# Patient Record
Sex: MALE
[De-identification: names, ages, dates, MRNs, and addresses within clinical notes are randomized per-mention and may not be internally consistent; named-entity substitution may affect disease eponyms.]

---

## 2017-05-15 ENCOUNTER — HOSPITAL ENCOUNTER (OUTPATIENT)
Dept: HOSPITAL 14 - H.OPSURG | Age: 62
Discharge: HOME | End: 2017-05-15
Attending: ANESTHESIOLOGY
Payer: MEDICARE

## 2017-05-15 VITALS — HEART RATE: 59 BPM | DIASTOLIC BLOOD PRESSURE: 74 MMHG | TEMPERATURE: 97.6 F | SYSTOLIC BLOOD PRESSURE: 121 MMHG

## 2017-05-15 VITALS — RESPIRATION RATE: 18 BRPM

## 2017-05-15 VITALS — OXYGEN SATURATION: 99 %

## 2017-05-15 VITALS — BODY MASS INDEX: 30.1 KG/M2

## 2017-05-15 DIAGNOSIS — M54.16: Primary | ICD-10-CM

## 2017-05-15 DIAGNOSIS — E11.9: ICD-10-CM

## 2017-05-15 PROCEDURE — 82948 REAGENT STRIP/BLOOD GLUCOSE: CPT

## 2017-05-15 PROCEDURE — 64483 NJX AA&/STRD TFRM EPI L/S 1: CPT

## 2017-05-15 NOTE — OP
PROCEDURE DATE: 05/15/2017



PROCEDURE:  Right transforaminal epidural at right L5 under fluoroscopic 
guidance.



PREOPERATIVE DIAGNOSIS:  Lumbar radiculopathy.



POSTOPERATIVE DIAGNOSIS:  Lumbar radiculopathy.



COMPLICATIONS:  None.



EXPECTED BLOOD LOSS:  None.



After informed consent was obtained, the patient was brought to the OR and 
placed on the table in prone position.  All pressure points were padded, and 
sedation was administered by anesthesia.  Next, lumbosacral spine was then 
prepped and draped with iodine x 3 and draped in normal sterile fashion.  The 
right L5 transforaminal space was identified using AP and oblique views.  One 
mL of 1% lidocaine was used to create a skin wheal.  Using a 22 gauge 3-1/2 
inch spinal needle, the needle was advanced into the right L5 transforaminal 
epidural space, towards 6 oclock position on right L5 pedicle, without 
paresthesia.  After negative aspiration for heme or CSF, 2 mL of Omnipaque 300 
contrast dye was used to delineate the epidural space.  After negative 
aspiration for heme or CSF, 5 mL of 0.5% lidocaine and Depo-Medrol was easily 
instilled.  This procedure was attempted at the right L4 transforaminal 
epidural space, but the needle could not be inserted into the right L4 
transforaminal epidural space.  The patient had no paresthesia during the 
procedure.   Depo-Medrol 80 mg were used for the case.  The patient had no 
paresthesia during the procedure.  The patient was brought to stage II recovery 
room with bilateral lower extremity motor and sensory intact.





__________________________________________

Mario Chakraborty MD







cc:   



DD: 05/15/2017 10:38:44  [id number]

TT: 05/15/2017 11:35:23

Job # [id number]

en

MTDD

## 2017-10-23 ENCOUNTER — HOSPITAL ENCOUNTER (OUTPATIENT)
Dept: HOSPITAL 14 - H.OPSURG | Age: 62
Discharge: HOME | End: 2017-10-23
Attending: ANESTHESIOLOGY
Payer: MEDICARE

## 2017-10-23 VITALS
SYSTOLIC BLOOD PRESSURE: 127 MMHG | DIASTOLIC BLOOD PRESSURE: 77 MMHG | HEART RATE: 62 BPM | RESPIRATION RATE: 18 BRPM | OXYGEN SATURATION: 98 %

## 2017-10-23 VITALS — BODY MASS INDEX: 27.4 KG/M2

## 2017-10-23 VITALS — TEMPERATURE: 98.2 F

## 2017-10-23 DIAGNOSIS — I25.10: ICD-10-CM

## 2017-10-23 DIAGNOSIS — G40.909: ICD-10-CM

## 2017-10-23 DIAGNOSIS — E11.9: ICD-10-CM

## 2017-10-23 DIAGNOSIS — M54.16: Primary | ICD-10-CM

## 2017-10-23 PROCEDURE — 62323 NJX INTERLAMINAR LMBR/SAC: CPT

## 2017-10-23 PROCEDURE — 82948 REAGENT STRIP/BLOOD GLUCOSE: CPT

## 2017-10-23 NOTE — RAD
PROCEDURE:  Epidural injection



HISTORY:

PAIN MANAGEMENT



COMPARISON:

None



TECHNIQUE:

Standard protocol for this study/examination.



FINDINGS:

 Total fluoroscopic time (continuous mode) utilized during the 

procedure: 3.8 seconds.



IMPRESSION:

Less than 1 hr fluoroscopic time utilized during performance of the 

procedure.

## 2017-10-24 NOTE — OP
PROCEDURE DATE:  10/23/2017



PREOPERATIVE DIAGNOSIS:  Lumbar radiculopathy.



POSTOPERATIVE DIAGNOSIS:  Lumbar radiculopathy.



PROCEDURE:  Caudal epidural under fluoroscopic guidance.



ESTIMATED BLOOD LOSS:  None.



COMPLICATIONS:  None.



DESCRIPTION OF PROCEDURE:  After informed consent was obtained, the patient

was brought to the OR and placed on the table in a prone position.  All

pressure points were padded and sedation was administered by Anesthesia. 

The lumbosacral spine was prepped and draped with iodine x3 and draped in

normal sterile fashion.  2 mL of 1% lidocaine was used to create a skin

wheal using a 25-gauge needle.  X-ray was used in the AP and lateral views

to identify the sacral hiatus.  A 22-gauge Tuohy needle was advanced under

fluoroscopic guidance to yarbrough the sacrococcygeal ligament.  There was no

paresthesia during placement of the needle.  After a negative aspiration

for heme or CSF, 2 mL of contrast was used to delineate the epidural space.

After negative aspiration for heme or CSF, 20 mL of mixture of 0.5% 
preservative free

lidocaine, 0.25% preservative free Marcaine, and 80 mg of Depo-Medrol was 
easily instilled

into the epidural space.  There was no paresthesia during the procedure. 

After the injection, the patient was brought to stage II recovery room. 

The patient had bilateral lower extremity motor and sensory intact.  The

patient was discharged to same day surgery with stable vital signs.



__________________________________________

Mario Chakraborty MD



DD:  10/23/2017 13:28:39

DT:  10/23/2017 14:00:11

Job # [id number]



STEPHANIE

## 2018-04-23 ENCOUNTER — HOSPITAL ENCOUNTER (OUTPATIENT)
Dept: HOSPITAL 14 - H.OPSURG | Age: 63
Discharge: HOME | End: 2018-04-23
Attending: ANESTHESIOLOGY
Payer: MEDICARE

## 2018-04-23 VITALS — OXYGEN SATURATION: 100 %

## 2018-04-23 VITALS
HEART RATE: 52 BPM | TEMPERATURE: 98 F | RESPIRATION RATE: 18 BRPM | DIASTOLIC BLOOD PRESSURE: 70 MMHG | SYSTOLIC BLOOD PRESSURE: 111 MMHG

## 2018-04-23 VITALS — BODY MASS INDEX: 29.4 KG/M2

## 2018-04-23 DIAGNOSIS — E11.9: ICD-10-CM

## 2018-04-23 DIAGNOSIS — E78.5: ICD-10-CM

## 2018-04-23 DIAGNOSIS — I10: ICD-10-CM

## 2018-04-23 DIAGNOSIS — G40.909: ICD-10-CM

## 2018-04-23 DIAGNOSIS — M46.1: Primary | ICD-10-CM

## 2018-04-23 PROCEDURE — 82948 REAGENT STRIP/BLOOD GLUCOSE: CPT

## 2018-04-23 NOTE — RAD
PROCEDURE:  Fluoroscopy up to 1 hr.



HISTORY:

INJECTION



COMPARISON:

None



TECHNIQUE:

Standard protocol for this study/examination.



FINDINGS:

 Total fluoroscopic time (continuous mode) utilized during the 

procedure 16.5 (seconds).  Total exam DLP: (mGy) 2.14 



IMPRESSION:

Less than 1 hr fluoroscopic time utilized during performance of the 

procedure.

## 2018-04-27 NOTE — OP
DATE:  04/23/2018



LOCATION:  [facility].



PREOPERATIVE DIAGNOSIS:  Sacroiliitis.



POSTOPERATIVE DIAGNOSIS:  Sacroiliitis.



PROCEDURE:  Sacroiliac joint injection under x-ray guidance.



SURGEON:  Mario Chakraborty MD



ANESTHESIOLOGIST:  Hemant Hernandez MD



COMPLICATIONS:  None.



ESTIMATED BLOOD LOSS:  None.



DESCRIPTION OF PROCEDURE:  After informed consent was obtained, the patient

was brought to the OR and placed on the table on prone position.  All

pressure points were padded and sedation was administered by Anesthesia. 

The lumbosacral spine was prepped with iodine x3 and draped in normal

sterile fashion.  X-ray was used in the AP view to identify the sacroiliac

joint.  A 3 mL of 1% lidocaine was used to create a skin wheal, using a

25-gauge needle.  A 22-gauge 3-1/2-inch spinal needle was placed to bony

contact at the inferomedial portion of the right sacroiliac joint.  After

negative aspiration for heme or CSF, 2 mL of 1% lidocaine and Depo-Medrol

was easily instilled.  This procedure was repeated for the middle and

superior borders of the right sacroiliac joint and lower border of the left

sacroiliac joint.  There was no paresthesia during the case.  An 80 mg of

Depo-Medrol was used for the case.  The patient was brought to stage II

recovery room with bilateral lower extremity motor and sensory intact.







__________________________________________

Mario Chakraborty MD





DD:  04/26/2018 7:11:08

DT:  04/26/2018 8:33:47

Job # [id number]



MTDD

## 2018-09-04 ENCOUNTER — HOSPITAL ENCOUNTER (EMERGENCY)
Dept: HOSPITAL 14 - H.ER | Age: 63
Discharge: HOME | End: 2018-09-04
Payer: MEDICARE

## 2018-09-04 VITALS
SYSTOLIC BLOOD PRESSURE: 114 MMHG | OXYGEN SATURATION: 100 % | HEART RATE: 57 BPM | DIASTOLIC BLOOD PRESSURE: 63 MMHG | RESPIRATION RATE: 18 BRPM

## 2018-09-04 VITALS — TEMPERATURE: 97.7 F

## 2018-09-04 VITALS — BODY MASS INDEX: 29.4 KG/M2

## 2018-09-04 DIAGNOSIS — I10: ICD-10-CM

## 2018-09-04 DIAGNOSIS — Z79.84: ICD-10-CM

## 2018-09-04 DIAGNOSIS — Z82.49: ICD-10-CM

## 2018-09-04 DIAGNOSIS — Z79.82: ICD-10-CM

## 2018-09-04 DIAGNOSIS — E03.9: ICD-10-CM

## 2018-09-04 DIAGNOSIS — E78.00: ICD-10-CM

## 2018-09-04 DIAGNOSIS — I25.10: ICD-10-CM

## 2018-09-04 DIAGNOSIS — Z95.1: ICD-10-CM

## 2018-09-04 DIAGNOSIS — E11.9: ICD-10-CM

## 2018-09-04 DIAGNOSIS — M79.1: Primary | ICD-10-CM

## 2018-09-04 NOTE — ED PDOC
Lower Extremity Pain/Injury


Time Seen by Provider: 09/04/18 18:38


Chief Complaint (Nursing): Chest Pain


History Per: Patient


Onset/Duration Of Symptoms: Days (2)


Current Symptoms Are (Timing): Still Present


Severity: Mild


Additional Complaint(s): 





Left thigh pain x 2 days. No h/o trauma. Also c/o right sided chest pain. 

Denies SOB





Past Medical History


Vital Signs: 





 Last Vital Signs











Temp  97.7 F   09/04/18 17:50


 


Pulse  68   09/04/18 17:50


 


Resp  16   09/04/18 17:50


 


BP  97/61 L  09/04/18 17:50


 


Pulse Ox  98   09/04/18 17:50














- Medical History


PMH: CAD, Diabetes, HTN, Hypercholesterolemia, Hypothyroidism, Seizures


   Denies: HIV, Chronic Kidney Disease





- Surgical History


Surgical History: CABG (X 5 in 2006)





- Family History


Family History: States: Unknown Family Hx, CAD





- Immunization History


Hx Tetanus Toxoid Vaccination: No


Hx Influenza Vaccination: No


Hx Pneumococcal Vaccination: No





- Home Medications


Home Medications: 


 Ambulatory Orders











 Medication  Instructions  Recorded


 


Lisinopril 20 mg PO DAILY 03/24/16


 


Vitamin B Complex 1 cap PO HS 03/24/16


 


Ascorbic Acid [Vitamin C] 1,000 mg PO HS #0 tablet 10/26/16


 


Aspirin [Aspirin EC] 325 mg PO HS #0 tablet. 10/26/16


 


Atorvastatin [Lipitor] 40 mg PO DAILY #0 tab 10/26/16


 


Glimepiride 1 mg PO BID #0 tablet 10/26/16


 


Magnesium Oxide 500 mg PO HS #0 tablet 10/26/16


 


MetFORMIN [glucoPHAGE] 1,000 mg PO BID #0 tab 10/26/16


 


Metoprolol Tartrate [Lopressor] 25 mg PO Q12 #0 tab 10/26/16


 


Calcium Carbonate/Vitamin D3 1 tab PO DAILY 12/27/16





[Calcium 1,000 + D3 Caplet]  


 


Cyanocobalamin [Vitamin B12 1000 1 tab PO DAILY 12/27/16





mcg Tab]  


 


Levetiracetam [Keppra] 1,500 mg PO BID 30 Days  tablet 12/27/16


 


Omega-3-Acid Ethyl Esters [OMEGA 3] 1,000 mg PO DAILY 12/27/16


 


Pyridoxine [Vitamin B6] 200 mg PO DAILY 12/27/16


 


Vitamin E [Vitamin E 400 Units Cap] 1 cap PO DAILY 12/27/16


 


Naproxen [Naprosyn] 500 mg PO Q12H #20 tab 09/04/18














- Allergies


Allergies/Adverse Reactions: 


 Allergies











Allergy/AdvReac Type Severity Reaction Status Date / Time


 


No Known Allergies Allergy   Verified 09/04/18 17:50














Review of Systems


ROS Statement: Except As Marked, All Systems Reviewed And Found Negative


Cardiovascular: Positive for: Chest Pain


Respiratory: Negative for: Shortness of Breath


Musculoskeletal: Positive for: Leg Pain





Physical Exam





- Reviewed


Nursing Documentation Reviewed: Yes


Vital Signs Reviewed: Yes





- Physical Exam


Appears: Positive for: Non-toxic, No Acute Distress


Head Exam: Positive for: ATRAUMATIC, NORMAL INSPECTION, NORMOCEPHALIC


Skin: Positive for: Normal Color, Warm, DRY


Eye Exam: Positive for: EOMI, Normal appearance, PERRL


ENT: Positive for: Normal ENT Inspection


Neck: Positive for: Normal, Painless ROM


Cardiovascular/Chest: Positive for: Regular Rate, Rhythm


Respiratory: Positive for: CNT, Normal Breath Sounds


Gastrointestinal/Abdominal: Positive for: Normal Exam, Soft


Back: Positive for: Normal Inspection


Extremity: Positive for: Normal ROM.  Negative for: Calf Tenderness, Swelling


Neurologic/Psych: Positive for: Alert, Oriented





- ECG


O2 Sat by Pulse Oximetry: 98





Disposition





- Clinical Impression


Clinical Impression: 


 Musculoskeletal pain








- Patient ED Disposition


Is Patient to be Admitted: No


Counseled Patient/Family Regarding: Studies Performed, Diagnosis, Need For 

Followup, Rx Given





- Disposition


Referrals: 


[facility] [Outside]


Disposition: Routine/Home


Disposition Time: 20:58


Condition: FAIR


Prescriptions: 


Naproxen [Naprosyn] 500 mg PO Q12H #20 tab


Instructions:  Muscle and Bone Pain (DC)


Forms:  [facility] (English)

## 2018-09-05 NOTE — RAD
Date of service: 



09/04/2018



HISTORY:

Right sided chest pain  



COMPARISON:

12/27/2016



TECHNIQUE:

Chest PA and lateral



FINDINGS:



LUNGS:

No active pulmonary disease.



PLEURA:

No significant pleural effusion identified. No pneumothorax apparent.



CARDIOVASCULAR:

No radiographic findings to suggest acute or significant 

cardiovascular disease. Incidental Finding(s): Postoperative changes 

related to sternotomy. 



OSSEOUS STRUCTURES:

No significant abnormalities.



VISUALIZED UPPER ABDOMEN:

Normal.



OTHER FINDINGS:

None.



IMPRESSION:

No significant interval change compared to the prior examination(s).

## 2018-09-05 NOTE — US
Date of service: 



09/04/2018



PROCEDURE:  Bilateral lower extremity venous duplex Doppler. 



HISTORY:

Left thigh pain



COMPARISON:

None available. 



TECHNIQUE:

Bilateral common femoral, superficial femoral, popliteal and 

posterior tibial veins were evaluated. Flow was assessed with color 

Doppler, compressibility, assessment of phasic flow and augmentation 

response. 



FINDINGS:



COMMON FEMORAL VEIN:

Right CFV:  Unremarkable.



Left CFV:  Unremarkable.



SUPERFICIAL FEMORAL VEIN:

Right SFV: Unremarkable.



Left SFV:  Unremarkable.



POPLITEAL VEIN:

Right Popliteal:  Unremarkable.



Left Popliteal:  Unremarkable.



POSTERIOR TIBIAL VEIN:

Right PTV: Unremarkable.



Left PTV: Unremarkable.



OTHER FINDINGS:

None.



IMPRESSION:

No evidence of deep venous thrombosis.



________________________________________________



Concordant results (preliminary interpretation) provided by [facility].



Procedure Completed: 19:37.



Preliminary ([facility]) Report: Dictated and Authenticated: 20:16.



Final Interpretation: 12:55. September 5, 2018.

## 2019-01-18 ENCOUNTER — HOSPITAL ENCOUNTER (EMERGENCY)
Dept: HOSPITAL 14 - H.ER | Age: 64
Discharge: HOME | End: 2019-01-18
Payer: MEDICARE

## 2019-01-18 VITALS — BODY MASS INDEX: 29.4 KG/M2

## 2019-01-18 VITALS — TEMPERATURE: 98.8 F | SYSTOLIC BLOOD PRESSURE: 120 MMHG | HEART RATE: 72 BPM | DIASTOLIC BLOOD PRESSURE: 62 MMHG

## 2019-01-18 VITALS — RESPIRATION RATE: 18 BRPM | OXYGEN SATURATION: 97 %

## 2019-01-18 DIAGNOSIS — E03.9: ICD-10-CM

## 2019-01-18 DIAGNOSIS — I25.10: ICD-10-CM

## 2019-01-18 DIAGNOSIS — E78.00: ICD-10-CM

## 2019-01-18 DIAGNOSIS — E11.9: ICD-10-CM

## 2019-01-18 DIAGNOSIS — Z79.84: ICD-10-CM

## 2019-01-18 DIAGNOSIS — I10: ICD-10-CM

## 2019-01-18 DIAGNOSIS — G40.909: ICD-10-CM

## 2019-01-18 DIAGNOSIS — R05: Primary | ICD-10-CM

## 2019-01-18 DIAGNOSIS — Z95.1: ICD-10-CM

## 2019-01-18 LAB
BASOPHILS # BLD AUTO: 0 K/UL (ref 0–0.2)
BASOPHILS NFR BLD: 0.3 % (ref 0–2)
BUN SERPL-MCNC: 11 MG/DL (ref 9–20)
CALCIUM SERPL-MCNC: 8.5 MG/DL (ref 8.4–10.2)
EOSINOPHIL # BLD AUTO: 0 K/UL (ref 0–0.7)
EOSINOPHIL NFR BLD: 0.2 % (ref 0–4)
ERYTHROCYTE [DISTWIDTH] IN BLOOD BY AUTOMATED COUNT: 13.4 % (ref 11.5–14.5)
GFR NON-AFRICAN AMERICAN: > 60
HGB BLD-MCNC: 12.1 G/DL (ref 12–18)
LYMPHOCYTES # BLD AUTO: 0.6 K/UL (ref 1–4.3)
LYMPHOCYTES NFR BLD AUTO: 10.5 % (ref 20–40)
MCH RBC QN AUTO: 31.5 PG (ref 27–31)
MCHC RBC AUTO-ENTMCNC: 33 G/DL (ref 33–37)
MCV RBC AUTO: 95.6 FL (ref 80–94)
MONOCYTES # BLD: 1 K/UL (ref 0–0.8)
MONOCYTES NFR BLD: 17 % (ref 0–10)
NEUTROPHILS # BLD: 4.1 K/UL (ref 1.8–7)
NEUTROPHILS NFR BLD AUTO: 72 % (ref 50–75)
NRBC BLD AUTO-RTO: 0.1 % (ref 0–0)
PLATELET # BLD: 117 K/UL (ref 130–400)
PMV BLD AUTO: 9.6 FL (ref 7.2–11.7)
RBC # BLD AUTO: 3.85 MIL/UL (ref 4.4–5.9)
WBC # BLD AUTO: 5.7 K/UL (ref 4.8–10.8)

## 2019-01-18 PROCEDURE — 80048 BASIC METABOLIC PNL TOTAL CA: CPT

## 2019-01-18 PROCEDURE — 85025 COMPLETE CBC W/AUTO DIFF WBC: CPT

## 2019-01-18 PROCEDURE — 71046 X-RAY EXAM CHEST 2 VIEWS: CPT

## 2019-01-18 PROCEDURE — 96374 THER/PROPH/DIAG INJ IV PUSH: CPT

## 2019-01-18 PROCEDURE — 93005 ELECTROCARDIOGRAM TRACING: CPT

## 2019-01-18 PROCEDURE — 83605 ASSAY OF LACTIC ACID: CPT

## 2019-01-18 PROCEDURE — 99283 EMERGENCY DEPT VISIT LOW MDM: CPT

## 2019-01-18 PROCEDURE — 84484 ASSAY OF TROPONIN QUANT: CPT

## 2019-01-18 NOTE — ED PDOC
History of Present Illness


History of Present Illness: 





63 year old male with CAD (5 bypasses), DM, Epilepsy, HTN, HLD presenting with 

cough, congestion x 5 weeks. States he has had the "flu" for 5 weeks with nasal 

congestion, headaches, bodyaches.  States he's been using only natural remedies 

with honey and lemon and a [place] remedy called "campoverde" without relief.  

[facility] he returned from [place] recently and is concerned he contracted an 

illness while he was over there.  [facility] for the past 2 days he has been having 

chest pain as well as a result of coughing.  States he has phlegm as well.  

[facility] he had a fever of 101 today.





PMD: Dr. Nuzhat Garsia, [facility]





HPI: Influenza


Time Seen by Provider: 19 19:53


Chief Complaint: Cough, Cold, Congestion


History Per: Patient


Onset/Duration Of Symptoms: Days (5 weeks)


Symptoms include: fever, headache, bodyaches, cough, nasal congestion, chest 

pain.  denies: sore throat, vomiting, diarrhea, syncope, seizure, rash, blurry 

vision


Sick Contacts (Context): Family Member(s)


Risk factors for flu complications: Yes: adult > 65 years, heart disease





Past Medical History


Vital Signs: 





                                Last Vital Signs











Temp  99.3 F   19 19:14


 


Pulse  67   19 19:14


 


Resp  18   19 19:14


 


BP  129/64   19 19:14


 


Pulse Ox  97   19 19:14














- Medical History


PMH: CAD, Diabetes, HTN, Hypercholesterolemia, Hypothyroidism, Seizures


   Denies: HIV, Chronic Kidney Disease





- Surgical History


Surgical History: CABG (X 5 in )





- Family History


Family History: States: Unknown Family Hx, CAD





- Immunization History


Hx Tetanus Toxoid Vaccination: No


Hx Influenza Vaccination: No


Hx Pneumococcal Vaccination: No





- Home Medications


Home Medications: 


                                Ambulatory Orders











 Medication  Instructions  Recorded


 


Lisinopril 20 mg PO DAILY 16


 


Ascorbic Acid [Vitamin C] 1,000 mg PO HS #0 tablet 10/26/16


 


Aspirin [Aspirin EC] 325 mg PO HS #0 tablet. 10/26/16


 


Atorvastatin [Lipitor] 40 mg PO DAILY #0 tab 10/26/16


 


Glimepiride 1 mg PO BID #0 tablet 10/26/16


 


Magnesium Oxide 500 mg PO HS #0 tablet 10/26/16


 


MetFORMIN [glucoPHAGE] 1,000 mg PO BID #0 tab 10/26/16


 


Metoprolol Tartrate [Lopressor] 25 mg PO Q12 #0 tab 10/26/16


 


Levetiracetam [Keppra] 1,500 mg PO BID 30 Days  tablet 16


 


Omega-3-Acid Ethyl Esters [OMEGA 3] 1,000 mg PO DAILY 16


 


Amoxicillin/Clavulanate [Augmentin 1 tab PO BID 7 Days #14 tab 19





875 MG-125 MG]  


 


Ibuprofen [Motrin Tab] 600 mg PO Q6 #30 tab 19














- Allergies


Allergies/Adverse Reactions: 


                                    Allergies











Allergy/AdvReac Type Severity Reaction Status Date / Time


 


No Known Allergies Allergy   Verified 19 19:17














Review of Systems


ROS Statement: Except As Marked, All Systems Reviewed And Found Negative


Constitutional: Positive for: Fever


ENT: Positive for: Nose Congestion


Cardiovascular: Positive for: Chest Pain.  Negative for: Palpitations


Respiratory: Positive for: Cough.  Negative for: Shortness of Breath


Gastrointestinal: Negative for: Nausea





Physical Exam





- Reviewed


Nursing Documentation Reviewed: Yes


Vital Signs Reviewed: Yes





- Physical Exam


Appears: Positive for: Well, Non-toxic, No Acute Distress


Head Exam: Positive for: ATRAUMATIC, NORMAL INSPECTION, NORMOCEPHALIC


Skin: Positive for: Normal Color, Warm, DRY


Eye Exam: Positive for: EOMI, Normal appearance, PERRL


ENT: Positive for: Normal ENT Inspection


Neck: Positive for: Normal, Painless ROM


Cardiovascular/Chest: Positive for: Regular Rate, Rhythm


Respiratory: Positive for: CNT, Normal Breath Sounds


Gastrointestinal/Abdominal: Positive for: Normal Exam, Soft


Back: Positive for: Normal Inspection


Extremity: Positive for: Normal ROM


Neurologic/Psych: Positive for: Alert, CNs II-XII, Oriented.  Negative for: 

Motor/Sensory Deficits





Medical Decision Making


Medical Decision MakinPM


63 year old with stated medical history presenting with viral/flu-like illness


--Patient is very well appearing with normal vitals 


--EKG is non-ischemic


--Exam non-focal


--Will check labs for abnormalities, CXR for PNA, bronchitis


--Will give Tordaol for chest pain and headache





930PM


--Patient has negative workup


--Given risk factors, will treat for clinical bacterial respiratory infection 

with Augmentin


--STrongly advised patient to see Dr. Garsia in the clinic first thing Monday


--REturn precautions given


--Very well appearing upon discharge





- Laboratory Results


Result Diagrams: 


                                 19 20:37





                                 19 20:37





- ECG


O2 Sat by Pulse Oximetry: 97





Disposition





- Clinical Impression


Clinical Impression: 


 Cough








- Patient ED Disposition


Is Patient to be Admitted: No





- Disposition


Referrals: 


Nuzhat Garsia MD [Resident] - 


Disposition: Routine/Home


Disposition Time: 21:39


Condition: GOOD


Prescriptions: 


Amoxicillin/Clavulanate [Augmentin 875 MG-125 MG] 1 tab PO BID 7 Days #14 tab


Ibuprofen [Motrin Tab] 600 mg PO Q6 #30 tab


Instructions:  Acute Bronchitis, Adult (DC), Cough in Adults


Forms:  [facility] (English)


Print Language: Burkinan

## 2019-01-19 NOTE — RAD
Date of service: 



01/18/2019



HISTORY:

 cough, chest pain 



COMPARISON:

Chest radiograph dated 09/04/2018



TECHNIQUE:

Chest PA and lateral



FINDINGS:



LUNGS:

No active pulmonary disease.



PLEURA:

No significant pleural effusion identified. No pneumothorax apparent.



CARDIOVASCULAR:

Prior sternotomy with sternal wires and surgical clips in place.  

Aortic atherosclerotic calcifications.  Cardiomediastinal silhouette 

stably enlarged. 



OSSEOUS STRUCTURES:

Unchanged.



VISUALIZED UPPER ABDOMEN:

Normal.



OTHER FINDINGS:

None.



IMPRESSION:

No active disease.

## 2019-01-19 NOTE — CARD
--------------- APPROVED REPORT --------------





Date of service: 01/18/2019



EKG Measurement

Heart [id number]

NM [id number]

[id number]

[id number]

[id number]



<Conclusion>

Normal sinus rhythm

Nonspecific ST abnormality

Abnormal ECG

## 2022-12-06 ENCOUNTER — NEW REFERRAL (OUTPATIENT)
Dept: URBAN - METROPOLITAN AREA CLINIC 73 | Facility: CLINIC | Age: 67
End: 2022-12-06

## 2022-12-06 DIAGNOSIS — H43.393: ICD-10-CM

## 2022-12-06 DIAGNOSIS — H35.033: ICD-10-CM

## 2022-12-06 DIAGNOSIS — H31.093: ICD-10-CM

## 2022-12-06 DIAGNOSIS — H25.13: ICD-10-CM

## 2022-12-06 DIAGNOSIS — E11.9: ICD-10-CM

## 2022-12-06 PROCEDURE — 92134 CPTRZ OPH DX IMG PST SGM RTA: CPT | Mod: NC

## 2022-12-06 PROCEDURE — 92250 FUNDUS PHOTOGRAPHY W/I&R: CPT

## 2022-12-06 PROCEDURE — 99204 OFFICE O/P NEW MOD 45 MIN: CPT

## 2022-12-06 PROCEDURE — 92201 OPSCPY EXTND RTA DRAW UNI/BI: CPT | Mod: NC

## 2022-12-06 ASSESSMENT — TONOMETRY
OD_IOP_MMHG: 9
OS_IOP_MMHG: 7

## 2022-12-06 ASSESSMENT — VISUAL ACUITY
OS_SC: 20/30-3
OS_PH: 20/30
OD_SC: 20/50-3
OD_PH: 20/40-3

## 2024-02-08 ENCOUNTER — FOLLOW UP (OUTPATIENT)
Dept: URBAN - METROPOLITAN AREA CLINIC 73 | Facility: CLINIC | Age: 69
End: 2024-02-08

## 2024-02-08 DIAGNOSIS — H35.033: ICD-10-CM

## 2024-02-08 DIAGNOSIS — E11.9: ICD-10-CM

## 2024-02-08 DIAGNOSIS — H31.093: ICD-10-CM

## 2024-02-08 PROCEDURE — 92202 OPSCPY EXTND ON/MAC DRAW: CPT

## 2024-02-08 PROCEDURE — 92134 CPTRZ OPH DX IMG PST SGM RTA: CPT

## 2024-02-08 PROCEDURE — 92014 COMPRE OPH EXAM EST PT 1/>: CPT

## 2024-02-08 ASSESSMENT — TONOMETRY
OS_IOP_MMHG: 12
OD_IOP_MMHG: 11

## 2024-02-08 ASSESSMENT — VISUAL ACUITY
OD_SC: 20/30-5
OD_PH: 20/25-2
OS_SC: 20/30-2
OS_PH: 20/25-2